# Patient Record
Sex: FEMALE
[De-identification: names, ages, dates, MRNs, and addresses within clinical notes are randomized per-mention and may not be internally consistent; named-entity substitution may affect disease eponyms.]

---

## 2018-08-10 ENCOUNTER — HOSPITAL ENCOUNTER (OUTPATIENT)
Dept: HOSPITAL 14 - H.OPSURG | Age: 73
Setting detail: OBSERVATION
LOS: 1 days | Discharge: HOME | End: 2018-08-11
Attending: OBSTETRICS & GYNECOLOGY | Admitting: OBSTETRICS & GYNECOLOGY
Payer: MEDICARE

## 2018-08-10 VITALS — BODY MASS INDEX: 35.6 KG/M2

## 2018-08-10 DIAGNOSIS — N95.0: ICD-10-CM

## 2018-08-10 DIAGNOSIS — D25.9: Primary | ICD-10-CM

## 2018-08-10 DIAGNOSIS — E66.3: ICD-10-CM

## 2018-08-10 DIAGNOSIS — E78.00: ICD-10-CM

## 2018-08-10 DIAGNOSIS — Z88.6: ICD-10-CM

## 2018-08-10 DIAGNOSIS — Z88.5: ICD-10-CM

## 2018-08-10 LAB
BASOPHILS # BLD AUTO: 0 K/UL (ref 0–0.2)
BASOPHILS NFR BLD: 0.5 % (ref 0–2)
EOSINOPHIL # BLD AUTO: 0.2 K/UL (ref 0–0.7)
EOSINOPHIL NFR BLD: 1.7 % (ref 0–4)
ERYTHROCYTE [DISTWIDTH] IN BLOOD BY AUTOMATED COUNT: 13.6 % (ref 11.5–14.5)
HGB BLD-MCNC: 14.5 G/DL (ref 12–16)
LYMPHOCYTES # BLD AUTO: 4.5 K/UL (ref 1–4.3)
LYMPHOCYTES NFR BLD AUTO: 46.6 % (ref 20–40)
MCH RBC QN AUTO: 30.9 PG (ref 27–31)
MCHC RBC AUTO-ENTMCNC: 34 G/DL (ref 33–37)
MCV RBC AUTO: 90.9 FL (ref 81–99)
MONOCYTES # BLD: 0.7 K/UL (ref 0–0.8)
MONOCYTES NFR BLD: 7.1 % (ref 0–10)
NEUTROPHILS # BLD: 4.3 K/UL (ref 1.8–7)
NEUTROPHILS NFR BLD AUTO: 44.1 % (ref 50–75)
NRBC BLD AUTO-RTO: 0.2 % (ref 0–0)
PLATELET # BLD: 316 K/UL (ref 130–400)
PMV BLD AUTO: 8.2 FL (ref 7.2–11.7)
RBC # BLD AUTO: 4.67 MIL/UL (ref 3.8–5.2)
WBC # BLD AUTO: 9.7 K/UL (ref 4.8–10.8)

## 2018-08-10 PROCEDURE — 88305 TISSUE EXAM BY PATHOLOGIST: CPT

## 2018-08-10 PROCEDURE — 36415 COLL VENOUS BLD VENIPUNCTURE: CPT

## 2018-08-10 PROCEDURE — 86900 BLOOD TYPING SEROLOGIC ABO: CPT

## 2018-08-10 PROCEDURE — 85027 COMPLETE CBC AUTOMATED: CPT

## 2018-08-10 PROCEDURE — 85025 COMPLETE CBC W/AUTO DIFF WBC: CPT

## 2018-08-10 PROCEDURE — 86850 RBC ANTIBODY SCREEN: CPT

## 2018-08-10 PROCEDURE — 58570 TLH UTERUS 250 G OR LESS: CPT

## 2018-08-10 RX ADMIN — WATER SCH MLS: 1 INJECTION INTRAMUSCULAR; INTRAVENOUS; SUBCUTANEOUS at 17:00

## 2018-08-10 RX ADMIN — HYDROMORPHONE HYDROCHLORIDE PRN MG: 1 INJECTION, SOLUTION INTRAMUSCULAR; INTRAVENOUS; SUBCUTANEOUS at 16:42

## 2018-08-10 RX ADMIN — HYDROMORPHONE HYDROCHLORIDE PRN MG: 1 INJECTION, SOLUTION INTRAMUSCULAR; INTRAVENOUS; SUBCUTANEOUS at 16:28

## 2018-08-10 RX ADMIN — HYDROMORPHONE HYDROCHLORIDE PRN MG: 1 INJECTION, SOLUTION INTRAMUSCULAR; INTRAVENOUS; SUBCUTANEOUS at 16:57

## 2018-08-10 RX ADMIN — HYDROMORPHONE HYDROCHLORIDE PRN MG: 1 INJECTION, SOLUTION INTRAMUSCULAR; INTRAVENOUS; SUBCUTANEOUS at 17:12

## 2018-08-11 VITALS
SYSTOLIC BLOOD PRESSURE: 110 MMHG | HEART RATE: 72 BPM | DIASTOLIC BLOOD PRESSURE: 75 MMHG | OXYGEN SATURATION: 96 % | RESPIRATION RATE: 19 BRPM

## 2018-08-11 VITALS — TEMPERATURE: 98.3 F

## 2018-08-11 LAB
ERYTHROCYTE [DISTWIDTH] IN BLOOD BY AUTOMATED COUNT: 13.9 % (ref 11.5–14.5)
HGB BLD-MCNC: 11.8 G/DL (ref 12–16)
MCH RBC QN AUTO: 30.4 PG (ref 27–31)
MCHC RBC AUTO-ENTMCNC: 33.1 G/DL (ref 33–37)
MCV RBC AUTO: 91.9 FL (ref 81–99)
PLATELET # BLD: 272 K/UL (ref 130–400)
RBC # BLD AUTO: 3.88 MIL/UL (ref 3.8–5.2)
WBC # BLD AUTO: 12.3 K/UL (ref 4.8–10.8)

## 2018-08-11 RX ADMIN — WATER SCH MLS/HR: 1 INJECTION INTRAMUSCULAR; INTRAVENOUS; SUBCUTANEOUS at 00:41

## 2018-08-13 NOTE — OP
Copied To:  Jose Miguel Corea MD

Attending MD:  Jose Miguel Corea MD



PROCEDURE DATE:  08/10/18



PREOPERATIVE DIAGNOSES:  Symptomatic fibroid uterus, postmenopausal

bleeding.



POSTOPERATIVE DIAGNOSES:  Symptomatic fibroid uterus, postmenopausal

bleeding.



OPERATION PERFORMED:  Robotic-assisted hysterectomy, cystoscopy with stent

placement.



SURGEON:  Jose Miguel Corea MD



ASSISTANT:  Lester Fajardo MD.  She was instrumental in taking care

of the patient.  She helped create exposure, obtain hemostasis, smoke

evacuation, and extraction of the specimen.  The procedure would not have

been possible without her assistance.



ESTIMATED BLOOD LOSS:  200 mL.



INTRAVENOUS FLUID INTAKE:  Patient received approximately 1500 mL of D5 LR

intraoperatively.



URINE OUTPUT:  Patient put out approximately 400 mL of clear urine.



OPERATIVE FINDINGS:  Fibroid uterus, large pedunculated posterior myoma. 

Normal ovaries and tubes.  Cystoscopic findings revealed bilateral efflux

of urine and intact dome of the bladder.



DESCRIPTION OF PROCEDURE:  After informed consent was obtained, the patient

was taken to the operating room where she was given general anesthesia. 

She was then prepped and draped in the normal sterile fashion.  Attention

was then turned to the urethra where cystoscope was performed.  Both

ureteral orifices were identified.  We then proceeded to stent the right

ureteral orifice, 5 mL of ICG green was injected.  A similar procedure was

performed on the left.  The procedure was performed in anticipation that we

may have to enter the retroperitoneal space due to the patient's fibroids

and the locations.  Attention was then turned to vagina.  The cervix was

identified and grasped with a single-tooth tenaculum.  The cervix was then

gently dilated and a VCare uterine manipulator was then inserted into the

uterine cavity as a mean to manipulate the uterus.  A Baez catheter was

inserted into the bladder to monitor the patient's urinary output. 

Attention was then turned to the abdomen where approximately 10 cm superior

to the umbilicus, Marcaine was infused and 8-mm incision was made and the

Veress needle was inserted into the abdominal cavity.  The abdomen was then

insufflated to 15 mmHg.  The Veress needle was removed and a robotic port

was introduced into the abdominal cavity.  Placement was confirmed with a

laparoscope.  The patient was then placed in Trendelenburg with findings

noted above.  There was an irregularly contoured uterus with a large

pedunculated posterior myoma and ovaries were small bilaterally.  Attention

was then turned to approximately 5 cm superior to the right anterior iliac

crest.  Marcaine was infused and an 8 mm incision was made and a robotic

port was introduced under direct visualization.  A similar procedure was

performed on the left approximately 10 cm right and lateral to the

umbilicus.  Marcaine was infused and 8 mm incision was made and robotic

port was introduced into the abdominal cavity.  Attention was then turned

to the left side of the abdomen where approximately 10 cm left to the

umbilicus, a 5 mm incision was made and an assist port was introduced into

the abdominal cavity.  The table was then lowered.  The patient was then

placed in steep Trendelenburg and the robot was brought along the patient's

side and docked without complication.  The instruments used for the surgery

were PK dissector, Felipe Suture Cut, ProGrasp, and a scissors.  The

instruments were placed.  I then broke scrub and proceeded to the surgical

console.  Attention was then turned to the left infundibulopelvic ligament.

The ovary was grasped with PK dissector.  The Firefly technology was

activated.  The ureter was clearly identified.  The infundibulopelvic

ligament was then dissected using the scissors and the PK dissector.  It

was serially coagulated, then transected with the scissors.  Attention was

then turned to the round ligament where it was serially coagulated then

transected with the scissors.  The vesicouterine peritoneum was then

undermined with PK dissector and transected down to the level of VCare cup

anteriorly.  Attention was then turned to the posterior aspect of the

uterus where the posterior peritoneum was identified and undermined with PK

dissector and transected with the scissors down to the level of VCare cup. 

The uterine arteries were then skeletonized downward to the level of the

cup.  It was serially coagulated and transected with the scissors.  The

ureter was identified inferiorly.  Attention was then turned to the left

side of the pelvis which in similar fashion, the infundibulopelvic ligament

was identified and dissected using the PK dissector and scissors.  Firefly

technology was then used to localize the ureter inferiorly.  The IP

ligament was then serially coagulated and then transected with the

scissors.  Attention was then turned to the round ligament, which in

similar fashion was serially coagulated and transected with the scissors. 

The vesicouterine peritoneum was then undermined down to the level of the

VCare cup anteriorly.  Attention was then turned posteriorly.  There was a

large pedunculated myoma.  The stalk was serially coagulated and transected

with scissors.  The specimen was placed in a cul-de-sac.  The posterior

aspect of the peritoneum was then undermined with PK down to the level of

VCare cup posteriorly.  The uterine arteries were then skeletonized, and

serially coagulated, and transected with the scissors.  The cup was then

identified anteriorly, posteriorly, and laterally.  The cervix and uterus

were then amputated from the vagina and then delivered vaginally.  The

remaining fibroid was also delivered vaginally.  The vaginal cuff was then

closed with 2- 0 barbed suture.  The abdomen was then copiously irrigated. 

The irrigant was removed with a suction device.  Hemostasis was noted.  All

instruments were then removed from the abdomen.  A cystoscopy was

performed.  The dome of the bladder was noted to be intact.  We had clear

efflux of urine bilaterally.  All instruments were then removed from the

bladder.  The abdominal incisions were repaired with 3-0 Biosyn and

Dermabond.  All sponge, lap, needle, and instrument counts were correct x2,

and the patient was taken to the recovery room in awake and stable

condition.



__________________________________________

Jose Miguel Corea MD





DD:  08/11/2018 8:26:55

DT:  08/11/2018 14:48:13

Job # [id number]